# Patient Record
Sex: FEMALE | Race: WHITE | NOT HISPANIC OR LATINO | Employment: FULL TIME | URBAN - METROPOLITAN AREA
[De-identification: names, ages, dates, MRNs, and addresses within clinical notes are randomized per-mention and may not be internally consistent; named-entity substitution may affect disease eponyms.]

---

## 2021-03-26 DIAGNOSIS — Z23 ENCOUNTER FOR IMMUNIZATION: ICD-10-CM

## 2022-03-22 ENCOUNTER — EVALUATION (OUTPATIENT)
Dept: OCCUPATIONAL THERAPY | Facility: CLINIC | Age: 47
End: 2022-03-22
Payer: COMMERCIAL

## 2022-03-22 DIAGNOSIS — I89.0 LYMPHEDEMA: Primary | ICD-10-CM

## 2022-03-22 PROCEDURE — 97760 ORTHOTIC MGMT&TRAING 1ST ENC: CPT

## 2022-03-23 NOTE — PROGRESS NOTES
Daily Note/Garment measurment     Today's date: 3/22/2022  Patient name: Vero Dias  : 1975  MRN: [id number]  Referring provider: Cecily Xavier MD  Dx:   Encounter Diagnosis     ICD-10-CM    1  Lymphedema  I89 0                   Subjective: I got a new Tribute garment, but it doesn't fit  [facility] said to come to you to get re measured for the Tribute sleeve      Objective: See treatment diary below  See Tribute Leg Measurement Form 3/22/22    Assessment:  Patient brought in her most recent Tribute below knee night time garment  She is unable to don the garment as it is too small  Re measured for the garment and new measurements sent to [facility]      Plan: therapy on hold   Will DC once garment is fit on patient

## 2022-05-04 NOTE — PROGRESS NOTES
5/3/22: Patient did not return to therapy and did not return call regarding fit of garments  DC OT services